# Patient Record
Sex: FEMALE
[De-identification: names, ages, dates, MRNs, and addresses within clinical notes are randomized per-mention and may not be internally consistent; named-entity substitution may affect disease eponyms.]

---

## 2019-06-07 ENCOUNTER — HOSPITAL ENCOUNTER (OUTPATIENT)
Dept: HOSPITAL 53 - M RAD | Age: 19
End: 2019-06-07
Attending: NURSE PRACTITIONER
Payer: COMMERCIAL

## 2019-06-07 DIAGNOSIS — Z32.01: Primary | ICD-10-CM

## 2019-06-07 NOTE — REP
FIRST TRIMESTER ULTRASOUND:

 

Real-time sonographic evaluation of gravid uterus performed.  There is a single

living intrauterine gestation.  Estimated gestational age is 7 weeks 2 days based

on a crown-rump length of 12 mm, EDC 01/22/2020.  Fetal heart rate is 106 beats

per minute.  There is a small subchorionic hemorrhage measuring 10 x 5 x 10 mm.

No maternal adnexal region abnormalities are seen.

 

 

Electronically Signed by

Darwin Nation MD 06/10/2019 05:02 P